# Patient Record
Sex: MALE | Race: BLACK OR AFRICAN AMERICAN | NOT HISPANIC OR LATINO | Employment: FULL TIME | URBAN - METROPOLITAN AREA
[De-identification: names, ages, dates, MRNs, and addresses within clinical notes are randomized per-mention and may not be internally consistent; named-entity substitution may affect disease eponyms.]

---

## 2020-01-17 ENCOUNTER — HOSPITAL ENCOUNTER (EMERGENCY)
Facility: HOSPITAL | Age: 20
Discharge: HOME | End: 2020-01-17
Attending: EMERGENCY MEDICINE

## 2020-01-17 VITALS
SYSTOLIC BLOOD PRESSURE: 138 MMHG | WEIGHT: 150 LBS | OXYGEN SATURATION: 96 % | BODY MASS INDEX: 21 KG/M2 | HEART RATE: 76 BPM | TEMPERATURE: 98 F | RESPIRATION RATE: 18 BRPM | DIASTOLIC BLOOD PRESSURE: 64 MMHG | HEIGHT: 71 IN

## 2020-01-17 DIAGNOSIS — K12.1 VIRAL STOMATITIS: ICD-10-CM

## 2020-01-17 DIAGNOSIS — R09.82 POST-NASAL DRAINAGE: ICD-10-CM

## 2020-01-17 DIAGNOSIS — J02.9 ACUTE VIRAL PHARYNGITIS: Primary | ICD-10-CM

## 2020-01-17 DIAGNOSIS — B97.89 VIRAL STOMATITIS: ICD-10-CM

## 2020-01-17 LAB
CTP QC/QA: YES
S PYO RRNA THROAT QL PROBE: NEGATIVE

## 2020-01-17 PROCEDURE — 87081 CULTURE SCREEN ONLY: CPT

## 2020-01-17 PROCEDURE — 99284 EMERGENCY DEPT VISIT MOD MDM: CPT

## 2020-01-17 PROCEDURE — 25000003 PHARM REV CODE 250: Performed by: EMERGENCY MEDICINE

## 2020-01-17 PROCEDURE — 99284 PR EMERGENCY DEPT VISIT,LEVEL IV: ICD-10-PCS | Mod: ,,, | Performed by: EMERGENCY MEDICINE

## 2020-01-17 PROCEDURE — 99284 EMERGENCY DEPT VISIT MOD MDM: CPT | Mod: ,,, | Performed by: EMERGENCY MEDICINE

## 2020-01-17 RX ORDER — IBUPROFEN 600 MG/1
600 TABLET ORAL
Status: COMPLETED | OUTPATIENT
Start: 2020-01-17 | End: 2020-01-17

## 2020-01-17 RX ORDER — IBUPROFEN 600 MG/1
600 TABLET ORAL
Qty: 20 TABLET | Refills: 0 | Status: SHIPPED | OUTPATIENT
Start: 2020-01-17

## 2020-01-17 RX ADMIN — IBUPROFEN 600 MG: 600 TABLET, FILM COATED ORAL at 11:01

## 2020-01-17 NOTE — ED PROVIDER NOTES
"Encounter Date: 1/17/2020       History     Chief Complaint   Patient presents with    Sore Throat     x 2 days    Cough    Nasal Congestion     20 yo BM with 2 days of progressively worsening sore throat with some cough and congestion. No fever although may have occasional chill and some body aches. No earache , headache, chest / abdominal pain. Unable to eat for past 2 days due to throat pain and sensation of throat swelling.  No nausea, vomiting although had some night time episodes of post tussive emesis. No painful lymph nodes however tonsillar nodes feel "big" . No neck pain. No rashes / sores noted.   No known ill contacts.   PMH: No asthma, seizures      The history is provided by the patient and a parent.     Review of patient's allergies indicates:  No Known Allergies  History reviewed. No pertinent past medical history.  History reviewed. No pertinent surgical history.  History reviewed. No pertinent family history.  Social History     Tobacco Use    Smoking status: Current Every Day Smoker    Smokeless tobacco: Never Used   Substance Use Topics    Alcohol use: Not on file    Drug use: Yes     Types: Marijuana     Review of Systems   Constitutional: Positive for activity change, appetite change, chills and fatigue. Negative for diaphoresis. Fever:  subjective.   HENT: Positive for congestion, rhinorrhea, sore throat and voice change (pain with talking). Negative for dental problem, ear pain, facial swelling, mouth sores and nosebleeds. Trouble swallowing:  due to pain.    Eyes: Negative for photophobia, pain, discharge, redness, itching and visual disturbance.   Respiratory: Positive for cough. Negative for chest tightness, shortness of breath, wheezing and stridor.    Cardiovascular: Negative for chest pain and palpitations.   Gastrointestinal: Negative for abdominal distention, abdominal pain, diarrhea, nausea and vomiting.   Endocrine: Negative.    Genitourinary: Negative for decreased urine " volume and dysuria.   Musculoskeletal: Positive for myalgias. Negative for arthralgias, back pain, gait problem, joint swelling, neck pain and neck stiffness.   Skin: Negative for pallor and rash.   Allergic/Immunologic: Negative.    Neurological: Negative for dizziness, syncope, facial asymmetry, weakness, light-headedness, numbness and headaches.   Hematological: Negative for adenopathy. Does not bruise/bleed easily.   Psychiatric/Behavioral: Negative for agitation and confusion.   All other systems reviewed and are negative.      Physical Exam     Initial Vitals [01/17/20 1053]   BP Pulse Resp Temp SpO2   138/64 76 18 98.1 °F (36.7 °C) 96 %      MAP       --         Physical Exam    Nursing note and vitals reviewed.  Constitutional: Vital signs are normal. He appears well-developed and well-nourished. He is not diaphoretic. He is active and cooperative. He is easily aroused.  Non-toxic appearance. He appears ill ( mildly). No distress.   HENT:   Head: Normocephalic and atraumatic. Head is without abrasion, without contusion, without right periorbital erythema and without left periorbital erythema.   Right Ear: Hearing, tympanic membrane, external ear and ear canal normal.   Left Ear: Hearing, tympanic membrane, external ear and ear canal normal.   Nose: Rhinorrhea ( mild, clear) present. No mucosal edema or sinus tenderness. No epistaxis.   Mouth/Throat: Uvula is midline and mucous membranes are normal. Mucous membranes are not pale, not dry and not cyanotic. Oral lesions ( evolving ulcer at base. ) present. No trismus in the jaw. Normal dentition. No uvula swelling. Posterior oropharyngeal erythema ( mild - soft palate) present. No posterior oropharyngeal edema.       Eyes: Conjunctivae, EOM and lids are normal. Pupils are equal, round, and reactive to light. Right eye exhibits no chemosis and no discharge. Left eye exhibits no chemosis and no discharge. Right conjunctiva is not injected. Right conjunctiva has  no hemorrhage. Left conjunctiva is not injected. Left conjunctiva has no hemorrhage. No scleral icterus. Right eye exhibits normal extraocular motion. Left eye exhibits normal extraocular motion. Pupils are equal.   Neck: Trachea normal, normal range of motion, full passive range of motion without pain and phonation normal. Neck supple. No thyromegaly present. No stridor present. No tracheal tenderness, no spinous process tenderness and no muscular tenderness present. No edema and normal range of motion present. No neck rigidity. No JVD present.   Cardiovascular: Normal rate, regular rhythm, S1 normal, S2 normal, normal heart sounds and intact distal pulses.  No extrasystoles are present.  Exam reveals no friction rub.    No murmur heard.  Brisk capillary refill   Pulmonary/Chest: Effort normal and breath sounds normal. No accessory muscle usage or stridor. No tachypnea and no bradypnea. No respiratory distress. He has no decreased breath sounds. He has no wheezes. He has no rales. He exhibits no tenderness and no deformity.   Normal work of breathing    Abdominal: Soft. Bowel sounds are normal. He exhibits no distension and no mass. There is no tenderness. There is no rigidity, no guarding and no CVA tenderness.   Musculoskeletal: Normal range of motion. He exhibits no edema or tenderness.   Lymphadenopathy:        Head (right side): Tonsillar (3-4 mm  nontender) adenopathy present. No submental and no submandibular adenopathy present.        Head (left side): Tonsillar ( 2-3 mm  nontender) adenopathy present. No submental and no submandibular adenopathy present.     He has no cervical adenopathy.        Right cervical: No posterior cervical adenopathy present.       Left cervical: No posterior cervical adenopathy present.   Neurological: He is alert, oriented to person, place, and time and easily aroused. He has normal strength. He is not disoriented. He displays no tremor. No cranial nerve deficit or sensory  deficit. He exhibits normal muscle tone. Coordination and gait normal.   Skin: Skin is warm, dry and intact. Capillary refill takes less than 2 seconds. No bruising, no ecchymosis, no lesion, no petechiae, no purpura and no rash noted. Rash is not urticarial. No cyanosis or erythema. No pallor. Nails show no clubbing.   Psychiatric: He has a normal mood and affect. His speech is normal and behavior is normal. Judgment and thought content normal. Cognition and memory are normal.         ED Course    1410:  No progression of symptoms.  Will discharge home with supportive care / symptomatic treatment. Will give trial of Brompheniramine to alleviate postnasal drainage symptoms.        Procedures  Labs Reviewed   CULTURE, STREP A,  THROAT   POCT RAPID STREP A          Imaging Results    None          Medical Decision Making:   History:   I obtained history from: someone other than patient.       <> Summary of History: Friend    Old Medical Records: I decided to obtain old medical records.  Old Records Summarized: records from another hospital.       <> Summary of Records: Reviewed  prior ER visit note in [facility] via [facility] Everywhere . Significant findings addressed in HPI / PMH.    No prior [facility] system records found. Discussed prior history and care elsewhere with patient. History regarding prior significant illness / injuries obtained. Salient points addressed in note     Initial Assessment:   Hemodynamically stable adolescent with viral stomatitis who is adequately hydrated  Differential Diagnosis:   DDx includes: Pharyngitis- viral, strep, postnasal drainage / reactive, evolving peritonsillar / retropharyngeal cellulitis, allergic reaction, irritant   Clinical Tests:   Lab Tests: Ordered and Reviewed                                 Clinical Impression:       ICD-10-CM ICD-9-CM   1. Acute viral pharyngitis J02.8 462    B97.89    2. Viral stomatitis K12.1 528.00    B97.89 079.99   3. Post-nasal drainage R09.82 473.9                              Murtaza Frost III, MD  01/18/20 8345

## 2020-01-17 NOTE — DISCHARGE INSTRUCTIONS
Maintain increased fluid intake while symptoms are present     May take Tylenol / Motrin as needed for fever / discomfort     May begin with soft foods and resume usual foods as able      May apply a 50/50 mixture of Benadryl Elixir and Maalox ( 1  Tsp of mixture) to mouth sores every 2-3 hours as needed for control of mouth pain / improve oral intake      Return to ER for persistent vomiting, breathing difficulty, difficulty swallowing saliva / fluids, voice becomes hoarse , increased difficulty awakening Leeroy   , unusual behavior, inability to move neck / open mouth due to pain, noisy breathing when sitting quietly / attempting to sleep, visible blood / mucous in loose bowel movements or new concerns / worsening symptoms.

## 2020-01-17 NOTE — ED TRIAGE NOTES
"Patient presents to the ED and states:" I think I have the flu,my throat feels tight and I'm congested. It started about 2 days ago."  "

## 2020-01-17 NOTE — ED NOTES
LOC:The patient is awake, alert and cooperative with a calm affect, patient is aware of environment and behaving in an age appropriate manor, patient recognizes caregiver and is speaking appropriately for age.  APPEARANCE: Resting comfortably, in no acute distress, the patient has clean hair, skin and nails, patient's clothing is properly fastened.  RESPIRATORY: Airway is open and patent, respirations are spontaneous, normal respiratory effort and rate noted.   MUSCULOSKELETAL: Patient moving all extremities well, no obvious deformities noted.  SKIN: The skin is warm and dry, patient has normal skin turgor and moist mucus membranes, no breakdown or bruising noted.  ABDOMEN: Soft and non tender in all four quadrants.  SOCIAL:With friend  HEENT: Congested

## 2020-01-19 LAB — BACTERIA THROAT CULT: NORMAL
